# Patient Record
Sex: MALE | URBAN - METROPOLITAN AREA
[De-identification: names, ages, dates, MRNs, and addresses within clinical notes are randomized per-mention and may not be internally consistent; named-entity substitution may affect disease eponyms.]

---

## 2020-04-24 ENCOUNTER — OFFICE VISIT (OUTPATIENT)
Dept: URBAN - METROPOLITAN AREA CLINIC 11 | Facility: CLINIC | Age: 70
End: 2020-04-24
Payer: MEDICARE

## 2020-04-24 DIAGNOSIS — H40.1131 PRIMARY OPEN-ANGLE GLAUCOMA, BILATERAL, MILD STAGE: Primary | ICD-10-CM

## 2020-04-24 PROCEDURE — 92004 COMPRE OPH EXAM NEW PT 1/>: CPT | Performed by: OPTOMETRIST

## 2020-04-24 PROCEDURE — 92133 CPTRZD OPH DX IMG PST SGM ON: CPT | Performed by: OPTOMETRIST

## 2020-04-24 ASSESSMENT — KERATOMETRY
OD: 43.13
OS: 43.00

## 2020-04-24 ASSESSMENT — INTRAOCULAR PRESSURE
OD: 12
OS: 13

## 2020-04-24 NOTE — IMPRESSION/PLAN
Impression: Primary open-angle glaucoma, bilateral, mild stage: H40.1131. (on Latanoprost) OCT 4/20 7/8 83/100 Plan: OCT of RNFL ordered and performed today ou. RNFL thinning OD. Cont Latanoprost 1gt qhs ou.  Pt ed on importance of drops and regular follow ups. schedule VF.
f/u 3mns VF IOP